# Patient Record
Sex: MALE | Race: WHITE | NOT HISPANIC OR LATINO | Employment: STUDENT | URBAN - METROPOLITAN AREA
[De-identification: names, ages, dates, MRNs, and addresses within clinical notes are randomized per-mention and may not be internally consistent; named-entity substitution may affect disease eponyms.]

---
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
## 2023-07-13 NOTE — PROGRESS NOTES
Daily Progress Note - Critical Care   Peggy Kimble 14 y o  male MRN: [id number]  Unit/Bed#: PICU 338-01 Encounter: [id number]        ----------------------------------------------------------------------------------------  HPI/24hr events: Patient with episode of coughing fit and subsequent hypoxia while using the bathroom overnight  Flow increased to 40L with FiO2 at 70%  He was able to be weaned back to 30L @ 40% by this morning  13 y/o male transferred to the [facility] PICU for management of community acquired pneumonia and COVID-19  He had a cough with intermittent fevers for 4 days prior to presentation to the [place] ED for SOB and COVID like symptoms  He was found to be COVID+ and chest xray concerning for RLL consolidation   ---------------------------------------------------------------------------------------  SUBJECTIVE  Patient seen and examined at bedside  Review of Systems  Review of systems was reviewed and negative unless stated above in HPI/24-hour events   ---------------------------------------------------------------------------------------  Assessment and Plan:    Neuro:   · Diagnosis: No acute issues  ?  Plan:Continue routine neuro checks        CV:   · Diagnosis: No acute issues  ? Plan: On cardiac monitoring  ? Continue with routine vitals  ? 4/5 Troponin and BNP WNL        Pulm:  · Diagnosis: Community-acquired Pneumonia/COVID-19 Pneumonia  ? Plan: suspected RLL bacterial pneumonia super-imposed upon Covid-19  ? 3/31 CXR: RLL consolidation  ? 4/4 CXR: Increasing bilateral lung infiltrates, right > left  ? HFNC titrate to SpO2 > 92%  ? Continue with incentive spirometry and good pulmonary toilet  ? Encourage prone positioning, ideally 12hr/day  ? OOB as tolerated        GI:   · Diagnosis: No acute issues  ? Plan: stress ulcer prophylaxis with pepcid while on steroids        :   · Diagnosis: No acute issues  ? Plan: Continue to monitor for adequate urine output  ?  Strict I/Os        F/E/N:   · Plan:   ? F: D5NS @ 50ml/hr  ? E: replete as needed  ? N: regular diet, add ensure nutritional supplement        Heme/Onc:   · Diagnosis: Leukopenia   ? Plan: WBC down to 2 73 from 3 47 on admission  ? Continue to monitor with daily CBC  · Diagnosis: DVT prophylaxis with lovenox        Endo:   · Diagnosis: No acute issues  ? Plan: Maintain normoglycemia         ID:   · Diagnosis: CAP  ? Plan: D/c azithromycin, continue with ceftriaxone  ? Procal <0 05 from 0 27  ? Blood cultures pending, negative at 4 days  ? Continue with tylenol prn for pain/fevers  ? Continue to monitor fever curve  · Diagnosis: COVID-19  ? Plan: Increasing oxygen requirements overnight  ? Maintain oxygen saturation > 92%  ? Wean O2 as tolerated  ? Methylprednisolone 60mg BID  ? Continue with vit C, D and zinc  ? ID consulted, recs appreciated  ? Tocilizumab x1 dose  ? Increase steroids to BID        MSK/Skin:   · Diagnosis: No acute issues    Disposition: Continue Critical Care   Code Status: Level 1 - Full Code  ---------------------------------------------------------------------------------------  ICU CORE MEASURES    Prophylaxis   VTE Pharmacologic Prophylaxis: Enoxaparin (Lovenox)  VTE Mechanical Prophylaxis: sequential compression device  Stress Ulcer Prophylaxis: Famotidine PO      Invasive Devices Review  Invasive Devices     Peripheral Intravenous Line            Peripheral IV 21 Right Antecubital 5 days              Can any invasive devices be discontinued today?  Not applicable  ---------------------------------------------------------------------------------------  OBJECTIVE    Vitals   Vitals:    21 0607 21 0700 21 0800 21 0826   BP:  101/62 106/65    BP Location:       Pulse:  54 55    Resp:  (!) 28 (!) 27    Temp:       TempSrc:       SpO2:  95% 94% 95%   Weight: 112 kg (247 lb 12 8 oz)      Height:         Temp (24hrs), Av 7 °F (37 1 °C), Min:98 1 °F (36 7 °C), Max:99 °F (37 2 °C)  Current: Temperature: 99 °F (37 2 °C)    Respiratory:  SpO2: SpO2: 95 %  Nasal Cannula O2 Flow Rate (L/min): 30 L/min    Invasive/non-invasive ventilation settings   Respiratory    Lab Data (Last 4 hours)    None         O2/Vent Data (Last 4 hours)      04/06 0826          Non-Invasive Ventilation Mode HFNC (High flow)                   Physical Exam  Vitals signs and nursing note reviewed  Constitutional:       Appearance: He is well-developed  He is ill-appearing  He is not toxic-appearing  HENT:      Head: Normocephalic and atraumatic  Eyes:      Conjunctiva/sclera: Conjunctivae normal    Neck:      Musculoskeletal: Neck supple  Cardiovascular:      Rate and Rhythm: Normal rate and regular rhythm  Heart sounds: No murmur  Pulmonary:      Effort: Pulmonary effort is normal  No respiratory distress  Breath sounds: Examination of the right-lower field reveals decreased breath sounds  Examination of the left-lower field reveals decreased breath sounds  Decreased breath sounds present  Abdominal:      Palpations: Abdomen is soft  Tenderness: There is no abdominal tenderness  Skin:     General: Skin is warm and dry  Neurological:      Mental Status: He is alert           Laboratory and Diagnostics:  Results from last 7 days   Lab Units 04/06/21  0542 04/05/21  0519 04/04/21  0513 04/03/21  0545 04/02/21  0401 03/31/21  2236   WBC Thousand/uL 2 73* 3 00* 3 01* 2 52* 3 17* 3 47*   HEMOGLOBIN g/dL 13 7 13 4 13 6 12 8 13 9 15 4   HEMATOCRIT % 41 7 40 3 41 2 38 8 41 6 45 7   PLATELETS Thousands/uL 211 192 166 144* 107* 108*   NEUTROS PCT %  --  59 69 64  --  76*   BANDS PCT % 3  --   --   --  18*  --    MONOS PCT %  --  8 6 4  --  3*   MONO PCT % 13*  --   --   --  0*  --      Results from last 7 days   Lab Units 04/06/21  0542 04/04/21  1206 04/02/21  0401 03/31/21  2236   SODIUM mmol/L 141 140 138 138   POTASSIUM mmol/L 4 3 4 6 4 3 3 8   CHLORIDE mmol/L 109* 111* 108 99*   CO2 mmol/L 27 26 26 26   ANION GAP mmol/L 5 3* 4 13   BUN mg/dL 15 15 12 13   CREATININE mg/dL 0 70 0 79 0 89 1 12   CALCIUM mg/dL 8 5 7 7* 7 9* 8 3   GLUCOSE RANDOM mg/dL 139 133 109 98   ALT U/L 124* 89* 79* 91*   AST U/L 71* 101* 98* 94*   ALK PHOS U/L 79 73 87 112   ALBUMIN g/dL 2 9* 2 9* 3 1* 3 9   TOTAL BILIRUBIN mg/dL 0 33 0 38 0 37 0 50     Results from last 7 days   Lab Units 04/06/21  0542 04/04/21  1206   MAGNESIUM mg/dL 2 6 2 4   PHOSPHORUS mg/dL 3 8 3 6           Results from last 7 days   Lab Units 04/04/21  1206 03/31/21  2236   TROPONIN I ng/mL <0 02 <0 02     Results from last 7 days   Lab Units 03/31/21  2236   LACTIC ACID mmol/L 1 1     ABG:    VBG:    Results from last 7 days   Lab Units 04/06/21  0542 04/05/21  0519 04/04/21  1206 04/02/21  0526 04/01/21  1025   PROCALCITONIN ng/ml <0 05 0 27* 0 14 0 16 0 15       Micro  Results from last 7 days   Lab Units 03/31/21  2236   BLOOD CULTURE  No Growth After 5 Days  No Growth After 5 Days  Imaging:  I have personally reviewed pertinent reports  Intake and Output  I/O       04/04 0701 - 04/05 0700 04/05 0701 - 04/06 0700 04/06 0701 - 04/07 0700    P  O  600 600     I V  (mL/kg) 1200 (10 53) 1029 17 (9 19)     IV Piggyback 50 135     Total Intake(mL/kg) 1850 (16 23) 1764 17 (15 75)     Urine (mL/kg/hr) 1625 (0 59) 2280 (0 85)     Stool  0     Total Output 1625 2280     Net +225 -515 83            Unmeasured Stool Occurrence  1 x           Height and Weights   Height: 6' 2" (188 cm)  IBW: 82 2 kg  Body mass index is 31 82 kg/m²    Weight (last 2 days)     Date/Time   Weight    04/06/21 0607   112 (247 8)    04/06/21 0600   --    Comment rows:    OBSERV: Awake and alert at 04/06/21 0600    04/06/21 0500   --    Comment rows:    OBSERV: sleeping at 04/06/21 0500    04/06/21 0410   --    Comment rows:    OBSERV: sleeping at 04/06/21 0410    04/06/21 0300   --    Comment rows:    OBSERV: sleeping at 04/06/21 0300    04/06/21 0200   --    Comment rows:    OBSERV: sleeping at 04/06/21 0200    04/06/21 0110   --    Comment rows:    OBSERV: sleeping at 04/06/21 0110    04/06/21 0100   --    Comment rows:    OBSERV: sleeping at 04/06/21 0100    04/06/21 0000   --    Comment rows:    OBSERV: sleepy at 04/06/21 0000    04/05/21 2301   --    Comment rows:    OBSERV: recovering from ambulation at 04/05/21 2301    04/05/21 2200   --    Comment rows:    OBSERV: waking at 04/05/21 2200    04/05/21 2100   --    Comment rows:    OBSERV: sleepy at 04/05/21 2100    04/05/21 2000   --    Comment rows:    OBSERV: pt awake and watching tv at 04/05/21 2000 04/05/21 1900   --    Comment rows:    OBSERV: pt awake and watching tv at 04/05/21 1900 04/05/21 0539   114 (251 77)    04/04/21 1500   --    Comment rows:    OBSERV: pt awake and watching tv at 04/04/21 1500    04/04/21 1400   --    Comment rows:    OBSERV: pt sleeping in bed at 04/04/21 1400    04/04/21 1200   --    Comment rows:    OBSERV: pt awake in bed at 04/04/21 1200    04/04/21 0905   --    Comment rows:    OBSERV: after activity, pt abdominal breathing at 04/04/21 0905    04/04/21 0900   --    Comment rows:    OBSERV: OOB to chair at 04/04/21 0900    04/04/21 0800   --    Comment rows:    OBSERV: sleeping at 04/04/21 0800    04/04/21 0535   114 (252 21)                Nutrition       Diet Orders   (From admission, onward)             Start     Ordered    04/06/21 0932  Dietary nutrition supplements  Once     Question Answer Comment   Select Supplement: Ensure Enlive-Vanilla    Frequency Lunch, Dinner        04/06/21 0933    04/04/21 1759  Diet Pediatric; Pediatric House  Diet effective now     Comments: PLEASE put guest trays on disposable as well, thank you! Question Answer Comment   Diet Type Pediatric    Pediatric Pediatric House    Special Instructions Disposable Meal    Special Instructions Plastic Utensil Only    RD to adjust diet per protocol?  Yes        04/04/21 1759                  Active Medications  Scheduled Meds:  Current Facility-Administered Medications   Medication Dose Route Frequency Provider Last Rate    acetaminophen  650 mg Oral Q6H PRN Tonny Mayes MD      ascorbic acid  1,000 mg Oral BID Ean Veras MD      cefTRIAXone  1,000 mg Intravenous Q24H oTnny Mayes MD Stopped (04/05/21 2317)    cholecalciferol  2,000 Units Oral Daily Tonny Mayes MD      dextrose 5 % and sodium chloride 0 9 %  50 mL/hr Intravenous Continuous Ean Veras MD 50 mL/hr (04/05/21 1830)    enoxaparin  30 mg Subcutaneous Q12H [facility] Ean Veras MD      famotidine  20 mg Oral Daily Cornell Peter MD      methylPREDNISolone sodium succinate  60 mg Intravenous BID Cornell Peter MD      polyethylene glycol  17 g Oral Daily DO Zo Ramos-docusate sodium  1 tablet Oral HS Cornell Peter MD      zinc sulfate  220 mg Oral Daily Ean Veras MD       Continuous Infusions:  dextrose 5 % and sodium chloride 0 9 %, 50 mL/hr, Last Rate: 50 mL/hr (04/05/21 1830)      PRN Meds:   acetaminophen, 650 mg, Q6H PRN        Allergies   No Known Allergies  ---------------------------------------------------------------------------------------  Advance Directive and Living Will:      Power of :    POLST:    ---------------------------------------------------------------------------------------    Cornell Peter MD      Portions of the record may have been created with voice recognition software  Occasional wrong word or "sound a like" substitutions may have occurred due to the inherent limitations of voice recognition software    Read the chart carefully and recognize, using context, where substitutions have occurred No Scribe Attestation (For Scribes USE Only)... Attending Attestation (For Attendings USE Only).../Scribe Attestation (For Scribes USE Only)...

## 2023-11-19 ENCOUNTER — HOSPITAL ENCOUNTER (EMERGENCY)
Facility: HOSPITAL | Age: 18
Discharge: HOME | End: 2023-11-19
Attending: EMERGENCY MEDICINE | Admitting: EMERGENCY MEDICINE

## 2023-11-19 VITALS
HEART RATE: 104 BPM | RESPIRATION RATE: 18 BRPM | DIASTOLIC BLOOD PRESSURE: 94 MMHG | WEIGHT: 250 LBS | BODY MASS INDEX: 32.08 KG/M2 | HEIGHT: 74 IN | OXYGEN SATURATION: 98 % | SYSTOLIC BLOOD PRESSURE: 143 MMHG | TEMPERATURE: 97.6 F

## 2023-11-19 DIAGNOSIS — J02.9 ACUTE PHARYNGITIS: Primary | ICD-10-CM

## 2023-11-19 LAB — S PYO DNA THROAT QL NAA+PROBE: NOT DETECTED

## 2023-11-19 PROCEDURE — 99282 EMERGENCY DEPT VISIT SF MDM: CPT

## 2023-11-19 PROCEDURE — 99284 EMERGENCY DEPT VISIT MOD MDM: CPT | Performed by: EMERGENCY MEDICINE

## 2023-11-19 PROCEDURE — 87651 STREP A DNA AMP PROBE: CPT | Performed by: EMERGENCY MEDICINE

## 2023-11-19 NOTE — DISCHARGE INSTRUCTIONS
You have been seen for a sore throat. Please take tylenol and motrin as needed for discomfort. Return to the emergency department if you develop worsening pain/swelling, trouble breathing, neck swelling or any other symptoms of concern. Please follow up with your PCP as needed by calling the number provided.

## 2023-11-19 NOTE — ED PROVIDER NOTES
History  Chief Complaint   Patient presents with    Sore Throat     Pt presents with sore throat x 1 day     Sandy Christine is a 19y.o. year old male previously healthy presenting to the [facility] ED for sore throat. Patient reports onset of throat discomfort earlier this evening. Mostly located on the right side. Currently rated as 5/10, constant and worsened with swallowing. Patient denies fever/chills, cough, congestion or runny nose. He has no associated dental pain. There is no neck pain/swelling. There is no difficulty swallowing or change in voice. Patient denies chest pain, dyspnea, nausea/vomit/diarrhea or abdominal pain. Patient has not taken/received any medications at home for relief of symptoms. No reported sick contacts. History provided by:  Medical records and patient   used: No    Sore Throat  Associated symptoms: no abdominal pain, no chills, no cough, no ear pain, no fever, no headaches, no rhinorrhea, no shortness of breath, no trouble swallowing and no voice change        None       Past Medical History:   Diagnosis Date    Pneumonia        History reviewed. No pertinent surgical history. Family History   Problem Relation Age of Onset    No Known Problems Mother     No Known Problems Father     No Known Problems Sister      I have reviewed and agree with the history as documented. E-Cigarette/Vaping    E-Cigarette Use Never User      E-Cigarette/Vaping Substances    Nicotine No     THC No     Flavoring No     Other No     Unknown No      Social History     Tobacco Use    Smoking status: Never    Smokeless tobacco: Never   Vaping Use    Vaping Use: Never used   Substance Use Topics    Alcohol use: Never    Drug use: Never       Review of Systems   Constitutional:  Negative for chills and fever. HENT:  Positive for sore throat. Negative for congestion, dental problem, ear pain, rhinorrhea, trouble swallowing and voice change.     Respiratory:  Negative for cough and shortness of breath. Gastrointestinal:  Negative for abdominal pain, diarrhea, nausea and vomiting. Musculoskeletal:  Negative for neck pain. Neurological:  Negative for headaches. All other systems reviewed and are negative. Physical Exam  Physical Exam  Vitals and nursing note reviewed. Constitutional:       General: He is not in acute distress. Appearance: He is well-developed. He is not ill-appearing, toxic-appearing or diaphoretic. HENT:      Head: Normocephalic and atraumatic. Nose: No congestion or rhinorrhea. Mouth/Throat:      Pharynx: Uvula midline. Posterior oropharyngeal erythema present. No pharyngeal swelling, oropharyngeal exudate or uvula swelling. Tonsils: No tonsillar exudate or tonsillar abscesses. Neck:      Comments: No submandibular or submental edema/crepitus/erythema. Cardiovascular:      Rate and Rhythm: Normal rate and regular rhythm. Pulmonary:      Effort: Pulmonary effort is normal. No respiratory distress. Breath sounds: Normal breath sounds. No wheezing or rales. Abdominal:      Palpations: Abdomen is soft. Tenderness: There is no abdominal tenderness. There is no guarding or rebound. Musculoskeletal:      Cervical back: Normal range of motion. No edema, erythema, rigidity or crepitus. Lymphadenopathy:      Cervical: No cervical adenopathy. Skin:     General: Skin is warm. Capillary Refill: Capillary refill takes less than 2 seconds. Neurological:      Mental Status: He is alert and oriented to person, place, and time.    Psychiatric:         Mood and Affect: Mood normal.         Behavior: Behavior normal.         Vital Signs  ED Triage Vitals [11/19/23 0153]   Temperature Pulse Respirations Blood Pressure SpO2   97.6 °F (36.4 °C) 104 18 143/94 98 %      Temp Source Heart Rate Source Patient Position - Orthostatic VS BP Location FiO2 (%)   Temporal Monitor Sitting Right arm --      Pain Score       5 Vitals:    11/19/23 0153   BP: 143/94   Pulse: 104   Patient Position - Orthostatic VS: Sitting         Visual Acuity      ED Medications  Medications - No data to display    Diagnostic Studies  Results Reviewed       Procedure Component Value Units Date/Time    Strep A PCR [[id number]]  (Normal) Collected: 11/19/23 0215    Lab Status: Final result Specimen: Throat Updated: 11/19/23 0247     STREP A PCR Not Detected                   No orders to display              Procedures  Procedures         ED Course         CRAFFT      Flowsheet Row Most Recent Value   CRAFFT Initial Screen: During the past 12 months, did you:    1. Drink any alcohol (more than a few sips)? No Filed at: 11/19/2023 0155   2. Smoke any marijuana or hashish No Filed at: 11/19/2023 0155   3. Use anything else to get high? ("anything else" includes illegal drugs, over the counter and prescription drugs, and things that you sniff or 'alvarado')? No Filed at: 11/19/2023 0155                                            Medical Decision Making    19 y.o. male presenting for sore throat. Afebrile, vital stable. Will check strep PCR testing. Symptoms were likely related to viral pharyngitis. Patient declines analgesia in the emergency department. I have discussed with the patient our plan to discharge them from the ED and the patient is in agreement with this plan. The patient was provided a written after visit summary with strict RTED precautions. Discharge Plan:. Naproxen, Tylenol. Supportive care. Followup: I have discussed with the patient plan to follow up with their PCP. Contact information provided in AVS.    Amount and/or Complexity of Data Reviewed  Independent Historian:      Details: uncle  Labs: ordered.              Disposition  Final diagnoses:   Acute pharyngitis     Time reflects when diagnosis was documented in both MDM as applicable and the Disposition within this note       Time User Action Codes Description Comment 11/19/2023  3:14 AM Loretta Ritchie Add [J02.9] Acute pharyngitis           ED Disposition       ED Disposition   Discharge    Condition   Stable    Date/Time   Sun Nov 19, 2023 3392    Comment   [facility] discharge to home/self care. Follow-up Information       Follow up With Specialties Details Why Contact Andreina Chao MD Pediatrics Schedule an appointment as soon as possible for a visit  For reevaluation if symptoms do not resolve. [address] [facility]  [phone]              There are no discharge medications for this patient. No discharge procedures on file.     PDMP Review       None            ED Provider  Electronically Signed by             Barrera Boss DO  11/19/23 0293

## 2024-09-06 ENCOUNTER — OFFICE VISIT (OUTPATIENT)
Dept: URGENT CARE | Facility: MEDICAL CENTER | Age: 19
End: 2024-09-06

## 2024-09-06 VITALS
WEIGHT: 247.6 LBS | DIASTOLIC BLOOD PRESSURE: 90 MMHG | OXYGEN SATURATION: 99 % | SYSTOLIC BLOOD PRESSURE: 130 MMHG | HEART RATE: 62 BPM | TEMPERATURE: 96.9 F | HEIGHT: 74 IN | BODY MASS INDEX: 31.78 KG/M2 | RESPIRATION RATE: 20 BRPM

## 2024-09-06 DIAGNOSIS — M21.42 PES PLANUS OF LEFT FOOT: Primary | ICD-10-CM

## 2024-09-06 DIAGNOSIS — M72.2 PLANTAR FASCIITIS: ICD-10-CM

## 2024-09-06 PROCEDURE — G0382 LEV 3 HOSP TYPE B ED VISIT: HCPCS

## 2024-09-06 NOTE — PROGRESS NOTES
[facility]        NAME: Mushtaq Kimble is a 19 y.o. male  : 2005    MRN: [id number]  DATE: 2024  TIME: 7:39 PM    Assessment and Plan   Pes planus of left foot [M21.42]  1. Pes planus of left foot        2. Plantar fasciitis              Patient Instructions       Follow up with PCP in 3-5 days.  Proceed to  ER if symptoms worsen.    If tests are performed, our office will contact you with results only if changes need to made to the care plan discussed with you at the visit. [facility] on [facility]    Chief Complaint     Chief Complaint   Patient presents with   • Foot Pain     Pt states he started having pain in his left foot today and has a hard time walking and turning foot. He is in the process of moving and has been on his feet a lot.          History of Present Illness       Patient here with left foot pain which started this AM when he woke up. He has not taken anything for his pain. No known injury. Pain located on the bottom of his foot and on the medial mid-foot. Denies any recent fall or trauma. He has not had any fevers or chills. He has noted flat feet b/l. He reports he did get these new shoes in the past 2 weeks and has been on his feet a lot as he is an online  for [employer] and has also been doing a lot of moving boxes/furniture.         Review of Systems   Review of Systems   Constitutional:  Negative for appetite change, chills, fatigue and fever.   Cardiovascular:  Negative for leg swelling.   Musculoskeletal:  Positive for gait problem.        Walking with a limp     Skin:  Negative for color change and rash.         Current Medications     No current outpatient medications on file.    Current Allergies     Allergies as of 2024   • (No Known Allergies)            The following portions of the patient's history were reviewed and updated as appropriate: allergies, current medications, past family history, past medical  "history, past social history, past surgical history and problem list.     Past Medical History:   Diagnosis Date   • Pneumonia        History reviewed. No pertinent surgical history.    Family History   Problem Relation Age of Onset   • No Known Problems Mother    • No Known Problems Father    • No Known Problems Sister          Medications have been verified.        Objective   /90   Pulse 62   Temp (!) 96.9 °F (36.1 °C)   Resp 20   Ht 6' 2.4\" (1.89 m)   Wt 112 kg (247 lb 9.6 oz)   SpO2 99%   BMI 31.45 kg/m²        Physical Exam     Physical Exam  Vitals and nursing note reviewed.   Constitutional:       General: He is awake.      Appearance: Normal appearance. He is well-developed and normal weight.   HENT:      Head: Normocephalic and atraumatic.      Mouth/Throat:      Lips: Pink.      Mouth: Mucous membranes are moist.   Cardiovascular:      Rate and Rhythm: Normal rate and regular rhythm.      Pulses: Normal pulses.           Dorsalis pedis pulses are 2+ on the right side and 2+ on the left side.        Posterior tibial pulses are 2+ on the right side and 2+ on the left side.      Heart sounds: Normal heart sounds.   Pulmonary:      Effort: Pulmonary effort is normal.      Breath sounds: Normal breath sounds.   Musculoskeletal:      Right foot: Normal range of motion. No swelling or bony tenderness. Normal pulse.      Left foot: Normal range of motion. No swelling or bony tenderness. Normal pulse.   Feet:      Right foot:      Skin integrity: Skin integrity normal.      Toenail Condition: Right toenails are normal.      Left foot:      Skin integrity: Skin integrity normal.      Toenail Condition: Left toenails are normal.   Skin:     General: Skin is warm and dry.      Capillary Refill: Capillary refill takes less than 2 seconds.      Findings: No rash.   Neurological:      General: No focal deficit present.      Mental Status: He is alert. Mental status is at baseline.   Psychiatric:         " Mood and Affect: Mood normal.         Behavior: Behavior is cooperative.

## 2024-09-06 NOTE — PATIENT INSTRUCTIONS
You may take over the counter Tylenol (Acetaminophen) and/or Motrin (Ibuprofen) as needed, as directed on packaging.   Please follow up with your primary provider in the next several days. Should you have any worsening of symptoms, or lack of improvement please be re-evaluated. If needed for significant concerns, consider 911 or ER evaluation.

## 2025-02-23 ENCOUNTER — APPOINTMENT (EMERGENCY)
Dept: RADIOLOGY | Facility: HOSPITAL | Age: 20
End: 2025-02-23
Payer: COMMERCIAL

## 2025-02-23 ENCOUNTER — HOSPITAL ENCOUNTER (EMERGENCY)
Facility: HOSPITAL | Age: 20
Discharge: HOME | End: 2025-02-23
Attending: EMERGENCY MEDICINE | Admitting: EMERGENCY MEDICINE
Payer: COMMERCIAL

## 2025-02-23 VITALS
DIASTOLIC BLOOD PRESSURE: 79 MMHG | HEART RATE: 86 BPM | SYSTOLIC BLOOD PRESSURE: 131 MMHG | OXYGEN SATURATION: 99 % | RESPIRATION RATE: 20 BRPM | TEMPERATURE: 98.6 F

## 2025-02-23 DIAGNOSIS — R09.81 NASAL CONGESTION: Primary | ICD-10-CM

## 2025-02-23 DIAGNOSIS — R06.02 SOB (SHORTNESS OF BREATH): ICD-10-CM

## 2025-02-23 LAB
ALBUMIN SERPL BCG-MCNC: 4.6 G/DL (ref 3.5–5)
ALP SERPL-CCNC: 72 U/L (ref 34–104)
ALT SERPL W P-5'-P-CCNC: 32 U/L (ref 7–52)
ANION GAP SERPL CALCULATED.3IONS-SCNC: 8 MMOL/L (ref 4–13)
AST SERPL W P-5'-P-CCNC: 24 U/L (ref 13–39)
BASOPHILS # BLD AUTO: 0.03 THOUSANDS/ÂΜL (ref 0–0.1)
BASOPHILS NFR BLD AUTO: 0 % (ref 0–1)
BILIRUB SERPL-MCNC: 0.64 MG/DL (ref 0.2–1)
BUN SERPL-MCNC: 13 MG/DL (ref 5–25)
CALCIUM SERPL-MCNC: 9.5 MG/DL (ref 8.4–10.2)
CARDIAC TROPONIN I PNL SERPL HS: 4 NG/L (ref ?–50)
CHLORIDE SERPL-SCNC: 102 MMOL/L (ref 96–108)
CO2 SERPL-SCNC: 28 MMOL/L (ref 21–32)
CREAT SERPL-MCNC: 1 MG/DL (ref 0.6–1.3)
EOSINOPHIL # BLD AUTO: 0.11 THOUSAND/ÂΜL (ref 0–0.61)
EOSINOPHIL NFR BLD AUTO: 2 % (ref 0–6)
ERYTHROCYTE [DISTWIDTH] IN BLOOD BY AUTOMATED COUNT: 12.6 % (ref 11.6–15.1)
GFR SERPL CREATININE-BSD FRML MDRD: 107 ML/MIN/1.73SQ M
GLUCOSE SERPL-MCNC: 91 MG/DL (ref 65–140)
HCT VFR BLD AUTO: 45.9 % (ref 36.5–49.3)
HGB BLD-MCNC: 15.4 G/DL (ref 12–17)
IMM GRANULOCYTES # BLD AUTO: 0.02 THOUSAND/UL (ref 0–0.2)
IMM GRANULOCYTES NFR BLD AUTO: 0 % (ref 0–2)
LYMPHOCYTES # BLD AUTO: 2.82 THOUSANDS/ÂΜL (ref 0.6–4.47)
LYMPHOCYTES NFR BLD AUTO: 38 % (ref 14–44)
MCH RBC QN AUTO: 28.4 PG (ref 26.8–34.3)
MCHC RBC AUTO-ENTMCNC: 33.6 G/DL (ref 31.4–37.4)
MCV RBC AUTO: 85 FL (ref 82–98)
MONOCYTES # BLD AUTO: 0.5 THOUSAND/ÂΜL (ref 0.17–1.22)
MONOCYTES NFR BLD AUTO: 7 % (ref 4–12)
NEUTROPHILS # BLD AUTO: 3.89 THOUSANDS/ÂΜL (ref 1.85–7.62)
NEUTS SEG NFR BLD AUTO: 53 % (ref 43–75)
NRBC BLD AUTO-RTO: 0 /100 WBCS
PLATELET # BLD AUTO: 202 THOUSANDS/UL (ref 149–390)
PMV BLD AUTO: 8.1 FL (ref 8.9–12.7)
POTASSIUM SERPL-SCNC: 4.1 MMOL/L (ref 3.5–5.3)
PROT SERPL-MCNC: 7 G/DL (ref 6.4–8.4)
RBC # BLD AUTO: 5.43 MILLION/UL (ref 3.88–5.62)
SODIUM SERPL-SCNC: 138 MMOL/L (ref 135–147)
WBC # BLD AUTO: 7.37 THOUSAND/UL (ref 4.31–10.16)

## 2025-02-23 PROCEDURE — 80053 COMPREHEN METABOLIC PANEL: CPT | Performed by: EMERGENCY MEDICINE

## 2025-02-23 PROCEDURE — 93005 ELECTROCARDIOGRAM TRACING: CPT

## 2025-02-23 PROCEDURE — 71046 X-RAY EXAM CHEST 2 VIEWS: CPT

## 2025-02-23 PROCEDURE — 85025 COMPLETE CBC W/AUTO DIFF WBC: CPT | Performed by: EMERGENCY MEDICINE

## 2025-02-23 PROCEDURE — 36415 COLL VENOUS BLD VENIPUNCTURE: CPT | Performed by: EMERGENCY MEDICINE

## 2025-02-23 PROCEDURE — 99285 EMERGENCY DEPT VISIT HI MDM: CPT | Performed by: EMERGENCY MEDICINE

## 2025-02-23 PROCEDURE — 84484 ASSAY OF TROPONIN QUANT: CPT | Performed by: EMERGENCY MEDICINE

## 2025-02-23 PROCEDURE — 99285 EMERGENCY DEPT VISIT HI MDM: CPT

## 2025-02-23 RX ORDER — FLUTICASONE PROPIONATE 50 MCG
1 SPRAY, SUSPENSION (ML) NASAL DAILY
Qty: 16 G | Refills: 0 | Status: SHIPPED | OUTPATIENT
Start: 2025-02-23

## 2025-02-24 LAB
ATRIAL RATE: 81 BPM
P AXIS: 52 DEGREES
PR INTERVAL: 154 MS
QRS AXIS: 44 DEGREES
QRSD INTERVAL: 90 MS
QT INTERVAL: 376 MS
QTC INTERVAL: 436 MS
T WAVE AXIS: 50 DEGREES
VENTRICULAR RATE: 81 BPM

## 2025-02-24 PROCEDURE — 93010 ELECTROCARDIOGRAM REPORT: CPT | Performed by: INTERNAL MEDICINE

## 2025-02-24 NOTE — DISCHARGE INSTRUCTIONS
Please take flonase nasal spray in each nostril daily for nasal congestion.     You may also try taking daily allergy medication such as zytrec or claritin for nasal congestion and post nasal drip.

## 2025-02-24 NOTE — ED PROVIDER NOTES
Time reflects when diagnosis was documented in both MDM as applicable and the Disposition within this note       Time User Action Codes Description Comment    2/23/2025 10:39 PM Fadumo Bueno Add [R09.81] Nasal congestion     2/23/2025 10:40 PM Fadumo Bueno Add [R06.02] SOB (shortness of breath)           ED Disposition       ED Disposition   Discharge    Condition   Stable    Date/Time   Sun Feb 23, 2025 10:39 PM    Comment   Mushtaq Kimble discharge to home/self care.                   Assessment & Plan       Medical Decision Making  Amount and/or Complexity of Data Reviewed  Labs: ordered.  Radiology: ordered.        19-year-old male presenting for evaluation of shortness of breath for the past few days.   Patient has normal vitals, normal work of breathing, lungs sound clear.  Likely viral syndrome versus postnasal drip.  Will check chest x-ray to evaluate for pneumonia, CBC to evaluate for anemia, CMP to evaluate for metabolic abnormality, EKG and troponin to evaluate for arrhythmia or myocarditis.  Reviewed labs, no marked abnormalities.  Reviewed and interpreted chest x-ray, no acute cardiopulmonary disease.  EKG shows normal sinus rhythm without acute ischemic changes.  Discussed results with patient, discussed that his symptoms are likely due to upper respiratory symptoms causing postnasal drip, recommend symptomatic treatment with Flonase and over-the-counter Zyrtec or Claritin.  Will have patient follow up with PCP.  Discussed with patient strict return precautions.  Patient expressed understanding and was agreeable for discharge.         Medications - No data to display    ED Risk Strat Scores                            SBIRT 22yo+      Flowsheet Row Most Recent Value   Initial Alcohol Screen: US AUDIT-C     1. How often do you have a drink containing alcohol? 0 Filed at: 02/23/2025 2103   2. How many drinks containing alcohol do you have on a typical day you are drinking?  0 Filed at: 02/23/2025 2103    3a. Male UNDER 65: How often do you have five or more drinks on one occasion? 0 Filed at: 02/23/2025 2103   3b. FEMALE Any Age, or MALE 65+: How often do you have 4 or more drinks on one occassion? 0 Filed at: 02/23/2025 2103   Audit-C Score 0 Filed at: 02/23/2025 2103   JAYNE: How many times in the past year have you...    Used an illegal drug or used a prescription medication for non-medical reasons? Never Filed at: 02/23/2025 2103                            History of Present Illness       Chief Complaint   Patient presents with    Shortness of Breath     Pt states that he started with shortness of breath a few days ago and then started with chest pain today. Pt complains of a cough. Denies any fevers.        Past Medical History:   Diagnosis Date    Pneumonia       History reviewed. No pertinent surgical history.   Family History   Problem Relation Age of Onset    No Known Problems Mother     No Known Problems Father     No Known Problems Sister       Social History     Tobacco Use    Smoking status: Never    Smokeless tobacco: Never   Vaping Use    Vaping status: Never Used   Substance Use Topics    Alcohol use: Never    Drug use: Never      E-Cigarette/Vaping    E-Cigarette Use Never User       E-Cigarette/Vaping Substances    Nicotine No     THC No     Flavoring No     Other No     Unknown No       I have reviewed and agree with the history as documented.     HPI    19-year-old male presenting for evaluation of shortness of breath for the past few days.  Patient states today he developed pain in his chest.  Patient is in the left side of the chest.  Denies any worsening of pain with exertion.  He states shortness of breath is slightly worse with lying flat.  He has had congestion and postnasal drip.  He has also had a cough.  Denies fevers.  Denies nausea, vomiting, or diarrhea.  Denies leg pain or swelling.  Denies abdominal pain.  He also endorses generalized fatigue over the past few weeks.    Review of  Systems   Constitutional:  Negative for appetite change, chills and fever.   HENT:  Negative for congestion, rhinorrhea and sore throat.    Respiratory:  Positive for cough and shortness of breath.    Cardiovascular:  Positive for chest pain. Negative for leg swelling.   Gastrointestinal:  Negative for abdominal pain, diarrhea, nausea and vomiting.   Genitourinary:  Negative for dysuria, frequency, hematuria and urgency.   Musculoskeletal:  Negative for arthralgias and myalgias.   Skin:  Negative for rash.   Neurological:  Negative for dizziness, weakness, light-headedness, numbness and headaches.   All other systems reviewed and are negative.          Objective       ED Triage Vitals [02/23/25 2101]   Temperature Pulse Blood Pressure Respirations SpO2 Patient Position - Orthostatic VS   98.5 °F (36.9 °C) 85 137/88 18 98 % Lying      Temp Source Heart Rate Source BP Location FiO2 (%) Pain Score    Temporal Monitor Right arm -- 1      Vitals      Date and Time Temp Pulse SpO2 Resp BP Pain Score FACES Pain Rating User   02/23/25 2230 98.6 °F (37 °C) 86 99 % 20 131/79 No Pain -- AB   02/23/25 2200 98.6 °F (37 °C) 82 98 % 17 113/81 1 -- AB   02/23/25 2101 98.5 °F (36.9 °C) 85 98 % 18 137/88 1 -- AB            Physical Exam  Vitals and nursing note reviewed.   Constitutional:       General: He is not in acute distress.     Appearance: Normal appearance. He is well-developed and normal weight. He is not ill-appearing, toxic-appearing or diaphoretic.   HENT:      Head: Normocephalic and atraumatic.      Right Ear: External ear normal.      Left Ear: External ear normal.      Nose: Nose normal.      Mouth/Throat:      Mouth: Mucous membranes are moist.      Pharynx: Oropharynx is clear.   Eyes:      Extraocular Movements: Extraocular movements intact.      Conjunctiva/sclera: Conjunctivae normal.   Cardiovascular:      Rate and Rhythm: Normal rate and regular rhythm.      Pulses: Normal pulses.      Heart sounds: Normal  heart sounds. No murmur heard.     No friction rub. No gallop.   Pulmonary:      Effort: Pulmonary effort is normal. No respiratory distress.      Breath sounds: Normal breath sounds. No decreased breath sounds, wheezing, rhonchi or rales.   Abdominal:      General: There is no distension.      Palpations: Abdomen is soft.      Tenderness: There is no abdominal tenderness. There is no guarding or rebound.   Musculoskeletal:         General: No tenderness.      Cervical back: Neck supple.      Right lower leg: No tenderness. No edema.      Left lower leg: No tenderness. No edema.   Skin:     General: Skin is warm and dry.      Coloration: Skin is not pale.      Findings: No rash.   Neurological:      General: No focal deficit present.      Mental Status: He is alert and oriented to person, place, and time.      Cranial Nerves: No cranial nerve deficit.      Sensory: No sensory deficit.      Motor: No weakness.   Psychiatric:         Mood and Affect: Mood normal.         Behavior: Behavior normal.         Results Reviewed       Procedure Component Value Units Date/Time    HS Troponin 0hr (reflex protocol) [[id number]]  (Normal) Collected: 02/23/25 2121    Lab Status: Final result Specimen: Blood from Arm, Left Updated: 02/23/25 2149     hs TnI 0hr 4 ng/L     Comprehensive metabolic panel [[id number]] Collected: 02/23/25 2121    Lab Status: Final result Specimen: Blood from Arm, Left Updated: 02/23/25 2143     Sodium 138 mmol/L      Potassium 4.1 mmol/L      Chloride 102 mmol/L      CO2 28 mmol/L      ANION GAP 8 mmol/L      BUN 13 mg/dL      Creatinine 1.00 mg/dL      Glucose 91 mg/dL      Calcium 9.5 mg/dL      AST 24 U/L      ALT 32 U/L      Alkaline Phosphatase 72 U/L      Total Protein 7.0 g/dL      Albumin 4.6 g/dL      Total Bilirubin 0.64 mg/dL      eGFR 107 ml/min/1.73sq m     Narrative:      National Kid[employer] Disease Foundation guidelines for Chronic Kidney Disease (CKD):     Stage 1 with normal or high GFR (GFR  > 90 mL/min/1.73 square meters)    Stage 2 Mild CKD (GFR = 60-89 mL/min/1.73 square meters)    Stage 3A Moderate CKD (GFR = 45-59 mL/min/1.73 square meters)    Stage 3B Moderate CKD (GFR = 30-44 mL/min/1.73 square meters)    Stage 4 Severe CKD (GFR = 15-29 mL/min/1.73 square meters)    Stage 5 End Stage CKD (GFR <15 mL/min/1.73 square meters)  Note: GFR calculation is accurate only with a steady state creatinine    CBC and differential [[id number]]  (Abnormal) Collected: 02/23/25 2121    Lab Status: Final result Specimen: Blood from Arm, Left Updated: 02/23/25 2128     WBC 7.37 Thousand/uL      RBC 5.43 Million/uL      Hemoglobin 15.4 g/dL      Hematocrit 45.9 %      MCV 85 fL      MCH 28.4 pg      MCHC 33.6 g/dL      RDW 12.6 %      MPV 8.1 fL      Platelets 202 Thousands/uL      nRBC 0 /100 WBCs      Segmented % 53 %      Immature Grans % 0 %      Lymphocytes % 38 %      Monocytes % 7 %      Eosinophils Relative 2 %      Basophils Relative 0 %      Absolute Neutrophils 3.89 Thousands/µL      Absolute Immature Grans 0.02 Thousand/uL      Absolute Lymphocytes 2.82 Thousands/µL      Absolute Monocytes 0.50 Thousand/µL      Eosinophils Absolute 0.11 Thousand/µL      Basophils Absolute 0.03 Thousands/µL             XR chest 2 views   Final Interpretation by Moody Myles MD (02/23 2212)      No acute cardiopulmonary disease.            Workstation performed: [id number]             ECG 12 Lead Documentation Only    Date/Time: 2/24/2025 1:06 AM    Performed by: Fadumo Bueno MD  Authorized by: Fadumo Bueno MD    Indications / Diagnosis:  Sob, chest pain  ECG reviewed by me, the ED Provider: yes    Patient location:  ED  Previous ECG:     Previous ECG:  Compared to current    Similarity:  No change    Comparison to cardiac monitor: Yes    Interpretation:     Interpretation: normal    Rate:     ECG rate:  81    ECG rate assessment: normal    Rhythm:     Rhythm: sinus rhythm    Ectopy:     Ectopy: none    QRS:      QRS axis:  Normal    QRS intervals:  Normal  Conduction:     Conduction: normal    ST segments:     ST segments:  Normal  T waves:     T waves: normal        ED Medication and Procedure Management   None     Discharge Medication List as of 2/23/2025 10:40 PM        START taking these medications    Details   fluticasone (FLONASE) 50 mcg/act nasal spray 1 spray into each nostril daily, Starting Sun 2/23/2025, Normal           No discharge procedures on file.  ED SEPSIS DOCUMENTATION   Time reflects when diagnosis was documented in both MDM as applicable and the Disposition within this note       Time User Action Codes Description Comment    2/23/2025 10:39 PM Fadumo Bueno Add [R09.81] Nasal congestion     2/23/2025 10:40 PM Fadumo Bueno Add [R06.02] SOB (shortness of breath)                  Fadumo Bueno MD  02/24/25 0108

## 2025-02-26 LAB
ATRIAL RATE: 83 BPM
P AXIS: 52 DEGREES
PR INTERVAL: 150 MS
QRS AXIS: 48 DEGREES
QRSD INTERVAL: 94 MS
QT INTERVAL: 376 MS
QTC INTERVAL: 441 MS
T WAVE AXIS: 54 DEGREES
VENTRICULAR RATE: 83 BPM

## 2025-02-26 PROCEDURE — 93010 ELECTROCARDIOGRAM REPORT: CPT | Performed by: INTERNAL MEDICINE
